# Patient Record
Sex: FEMALE | URBAN - METROPOLITAN AREA
[De-identification: names, ages, dates, MRNs, and addresses within clinical notes are randomized per-mention and may not be internally consistent; named-entity substitution may affect disease eponyms.]

---

## 2024-08-22 ENCOUNTER — APPOINTMENT (RX ONLY)
Dept: URBAN - METROPOLITAN AREA CLINIC 281 | Facility: CLINIC | Age: 44
Setting detail: DERMATOLOGY
End: 2024-08-22

## 2024-08-22 DIAGNOSIS — D17 BENIGN LIPOMATOUS NEOPLASM: ICD-10-CM

## 2024-08-22 PROBLEM — D17.22 BENIGN LIPOMATOUS NEOPLASM OF SKIN AND SUBCUTANEOUS TISSUE OF LEFT ARM: Status: ACTIVE | Noted: 2024-08-22

## 2024-08-22 PROCEDURE — ? FULL BODY SKIN EXAM - DECLINED

## 2024-08-22 PROCEDURE — ? PHOTO-DOCUMENTATION

## 2024-08-22 PROCEDURE — ? ADDITIONAL NOTES

## 2024-08-22 PROCEDURE — ? COUNSELING

## 2024-08-22 PROCEDURE — 99202 OFFICE O/P NEW SF 15 MIN: CPT

## 2024-08-22 ASSESSMENT — LOCATION ZONE DERM: LOCATION ZONE: ARM

## 2024-08-22 ASSESSMENT — LOCATION SIMPLE DESCRIPTION DERM: LOCATION SIMPLE: LEFT UPPER ARM

## 2024-08-22 ASSESSMENT — LOCATION DETAILED DESCRIPTION DERM: LOCATION DETAILED: LEFT ANTERIOR PROXIMAL UPPER ARM

## 2024-08-22 NOTE — PROCEDURE: ADDITIONAL NOTES
Additional Notes: 4cm lipoma requires a referral to general surgery.  Discussed punch biopsy to obtain a diagnosis vs excision, and pt requested referral for excision.  Gave CPT codes for both procedures to patient as she wanted an estimate from her insurance beforehand.
Detail Level: Simple
Render Risk Assessment In Note?: no

## 2024-08-22 NOTE — HPI: SKIN LESION (LIPOMA)
How Severe Is Your Lipoma?: mild
Is This A New Presentation, Or A Follow-Up?: Skin Lesion
Additional History: Patient has a lipoma that has been enlarging.